# Patient Record
Sex: FEMALE | Race: WHITE
[De-identification: names, ages, dates, MRNs, and addresses within clinical notes are randomized per-mention and may not be internally consistent; named-entity substitution may affect disease eponyms.]

---

## 2017-05-26 ENCOUNTER — HOSPITAL ENCOUNTER (EMERGENCY)
Dept: HOSPITAL 4 - SED | Age: 72
Discharge: HOME | End: 2017-05-26
Payer: COMMERCIAL

## 2017-05-26 VITALS — HEIGHT: 57 IN | WEIGHT: 138 LBS | BODY MASS INDEX: 29.77 KG/M2

## 2017-05-26 VITALS — SYSTOLIC BLOOD PRESSURE: 154 MMHG

## 2017-05-26 VITALS — SYSTOLIC BLOOD PRESSURE: 127 MMHG

## 2017-05-26 DIAGNOSIS — J20.9: Primary | ICD-10-CM

## 2017-05-26 DIAGNOSIS — Z85.43: ICD-10-CM

## 2017-05-26 DIAGNOSIS — I10: ICD-10-CM

## 2017-05-26 DIAGNOSIS — Z88.0: ICD-10-CM

## 2017-05-26 LAB
ALBUMIN SERPL BCP-MCNC: 4.2 G/DL (ref 3.4–4.8)
ALT SERPL W P-5'-P-CCNC: 28 U/L (ref 12–78)
ANION GAP SERPL CALCULATED.3IONS-SCNC: 9 MMOL/L (ref 5–15)
AST SERPL W P-5'-P-CCNC: 30 U/L (ref 10–37)
BASOPHILS # BLD AUTO: 0 K/UL (ref 0–0.2)
BASOPHILS NFR BLD AUTO: 0.5 % (ref 0–2)
BILIRUB SERPL-MCNC: 0.5 MG/DL (ref 0–1)
BUN SERPL-MCNC: 22 MG/DL (ref 8–21)
CALCIUM SERPL-MCNC: 9.3 MG/DL (ref 8.4–11)
CHLORIDE SERPL-SCNC: 101 MMOL/L (ref 98–107)
CK MB SERPL-CCNC: 2.2 NG/ML (ref 0–3.6)
CK MB SERPL-RTO: 0.5 % (ref 0–2.9)
CK SERPL-CCNC: 452 U/L (ref 26–192)
CREAT SERPL-MCNC: 0.73 MG/DL (ref 0.55–1.3)
EOSINOPHIL # BLD AUTO: 0.1 K/UL (ref 0–0.4)
EOSINOPHIL NFR BLD AUTO: 1 % (ref 0–4)
ERYTHROCYTE [DISTWIDTH] IN BLOOD BY AUTOMATED COUNT: 14.7 % (ref 9–15)
GFR SERPL CREATININE-BSD FRML MDRD: (no result) ML/MIN (ref 90–?)
GLUCOSE SERPL-MCNC: 89 MG/DL (ref 70–99)
HCT VFR BLD AUTO: 36.2 % (ref 36–48)
HGB BLD-MCNC: 12 G/DL (ref 12–16)
INR PPP: 1 (ref 0.8–1.2)
LYMPHOCYTES # BLD AUTO: 1.5 K/UL (ref 1–5.5)
LYMPHOCYTES NFR BLD AUTO: 28.4 % (ref 20.5–51.5)
MCH RBC QN AUTO: 30 PG (ref 27–31)
MCHC RBC AUTO-ENTMCNC: 33 % (ref 32–36)
MCV RBC AUTO: 90 FL (ref 79–98)
MONOCYTES # BLD MANUAL: 0.4 K/UL (ref 0–1)
MONOCYTES # BLD MANUAL: 8.2 % (ref 1.7–9.3)
NEUTROPHILS # BLD AUTO: 3.1 K/UL (ref 1.8–7.7)
NEUTROPHILS NFR BLD AUTO: 61.9 % (ref 40–70)
PLATELET # BLD AUTO: 183 K/UL (ref 130–430)
POTASSIUM SERPL-SCNC: 3.9 MMOL/L (ref 3.5–5.1)
PROT SERPL-MCNC: 7.8 G/DL (ref 6.4–8.3)
PROTHROMBIN TIME: 10.5 SECS (ref 9.5–12.5)
RBC # BLD AUTO: 4 MIL/UL (ref 4.2–6.2)
SODIUM SERPLBLD-SCNC: 137 MMOL/L (ref 136–145)
WBC # BLD AUTO: 5.1 K/UL (ref 4.8–10.8)

## 2017-05-26 NOTE — NUR
No ER beds available at this time. Pt placed to ER waiting room in stable 
condition, no SOB noted, no neurodeficits, denies c/o C/P.

-------------------------------------------------------------------------------

Addendum: 05/26/17 at 1750 by JOSE

-------------------------------------------------------------------------------

Skin pink, warm, dry, cap refil < 2 sec.

## 2017-05-26 NOTE — NUR
Patient given written and verbal discharge instructions and verbalizes 
understanding.  ER MD Moscoso discussed with patient the results and treatment 
provided. Patient in stable condition. ID arm band removed. IV catheter removed 
intact and dressing applied, no active bleeding.

Rx of robitussin, zithromax, albuterol given. Patient educated on pain 
management and to follow up with PMD. Pain Scale 0/10.

Opportunity for questions provided and answered.

## 2017-05-26 NOTE — NUR
Stable, no shortness of breath or distress noted. Pt states had a "short of 
breath episode" prior to arrival at rest,states had prior similar "episode" 1 
month ago. Denies any chest pain. Patient able to communicate in full sentences 
w/o difficulty. No other complaints/injuries per pt or noted.

## 2017-07-08 ENCOUNTER — HOSPITAL ENCOUNTER (INPATIENT)
Dept: HOSPITAL 4 - SED | Age: 72
LOS: 6 days | Discharge: HOME | DRG: 415 | End: 2017-07-14
Attending: INTERNAL MEDICINE | Admitting: INTERNAL MEDICINE
Payer: COMMERCIAL

## 2017-07-08 VITALS — SYSTOLIC BLOOD PRESSURE: 133 MMHG

## 2017-07-08 VITALS — SYSTOLIC BLOOD PRESSURE: 131 MMHG

## 2017-07-08 VITALS — SYSTOLIC BLOOD PRESSURE: 124 MMHG

## 2017-07-08 VITALS — SYSTOLIC BLOOD PRESSURE: 157 MMHG

## 2017-07-08 VITALS — SYSTOLIC BLOOD PRESSURE: 112 MMHG

## 2017-07-08 VITALS — HEIGHT: 57 IN | WEIGHT: 141 LBS | BODY MASS INDEX: 30.42 KG/M2

## 2017-07-08 VITALS — SYSTOLIC BLOOD PRESSURE: 120 MMHG

## 2017-07-08 DIAGNOSIS — K43.0: ICD-10-CM

## 2017-07-08 DIAGNOSIS — E03.9: ICD-10-CM

## 2017-07-08 DIAGNOSIS — E87.6: ICD-10-CM

## 2017-07-08 DIAGNOSIS — Z92.21: ICD-10-CM

## 2017-07-08 DIAGNOSIS — M25.552: ICD-10-CM

## 2017-07-08 DIAGNOSIS — Z85.43: ICD-10-CM

## 2017-07-08 DIAGNOSIS — I10: ICD-10-CM

## 2017-07-08 DIAGNOSIS — K80.10: Primary | ICD-10-CM

## 2017-07-08 DIAGNOSIS — G47.33: ICD-10-CM

## 2017-07-08 DIAGNOSIS — Z88.0: ICD-10-CM

## 2017-07-08 DIAGNOSIS — R16.1: ICD-10-CM

## 2017-07-08 DIAGNOSIS — G89.29: ICD-10-CM

## 2017-07-08 DIAGNOSIS — Z92.3: ICD-10-CM

## 2017-07-08 DIAGNOSIS — D63.8: ICD-10-CM

## 2017-07-08 DIAGNOSIS — M25.551: ICD-10-CM

## 2017-07-08 LAB
ALBUMIN SERPL BCP-MCNC: 3.7 G/DL (ref 3.4–4.8)
ALT SERPL W P-5'-P-CCNC: 23 U/L (ref 12–78)
AMYLASE SERPL-CCNC: 32 U/L (ref 0–100)
ANION GAP SERPL CALCULATED.3IONS-SCNC: 9 MMOL/L (ref 5–15)
AST SERPL W P-5'-P-CCNC: 28 U/L (ref 10–37)
BASOPHILS # BLD AUTO: 0 K/UL (ref 0–0.2)
BASOPHILS NFR BLD AUTO: 0.4 % (ref 0–2)
BILIRUB SERPL-MCNC: 0.6 MG/DL (ref 0–1)
BUN SERPL-MCNC: 11 MG/DL (ref 8–21)
CALCIUM SERPL-MCNC: 8.6 MG/DL (ref 8.4–11)
CHLORIDE SERPL-SCNC: 103 MMOL/L (ref 98–107)
CREAT SERPL-MCNC: 0.67 MG/DL (ref 0.55–1.3)
EOSINOPHIL # BLD AUTO: 0.1 K/UL (ref 0–0.4)
EOSINOPHIL NFR BLD AUTO: 1.1 % (ref 0–4)
ERYTHROCYTE [DISTWIDTH] IN BLOOD BY AUTOMATED COUNT: 15.4 % (ref 9–15)
GFR SERPL CREATININE-BSD FRML MDRD: (no result) ML/MIN (ref 90–?)
GLUCOSE SERPL-MCNC: 99 MG/DL (ref 70–99)
HCT VFR BLD AUTO: 38.5 % (ref 36–48)
HGB BLD-MCNC: 12.7 G/DL (ref 12–16)
INR PPP: 0.9 (ref 0.8–1.2)
LIPASE SERPL-CCNC: 87 U/L (ref 73–393)
LYMPHOCYTES # BLD AUTO: 1.2 K/UL (ref 1–5.5)
LYMPHOCYTES NFR BLD AUTO: 21.8 % (ref 20.5–51.5)
MCH RBC QN AUTO: 30 PG (ref 27–31)
MCHC RBC AUTO-ENTMCNC: 33 % (ref 32–36)
MCV RBC AUTO: 90 FL (ref 79–98)
MONOCYTES # BLD MANUAL: 0.3 K/UL (ref 0–1)
MONOCYTES # BLD MANUAL: 6.3 % (ref 1.7–9.3)
NEUTROPHILS # BLD AUTO: 3.9 K/UL (ref 1.8–7.7)
NEUTROPHILS NFR BLD AUTO: 70.4 % (ref 40–70)
PLATELET # BLD AUTO: 183 K/UL (ref 130–430)
POTASSIUM SERPL-SCNC: 4.1 MMOL/L (ref 3.5–5.1)
PROT SERPL-MCNC: 7.4 G/DL (ref 6.4–8.3)
PROTHROMBIN TIME: 10.2 SECS (ref 9.5–12.5)
RBC # BLD AUTO: 4.28 MIL/UL (ref 4.2–6.2)
SODIUM SERPLBLD-SCNC: 139 MMOL/L (ref 136–145)
WBC # BLD AUTO: 5.5 K/UL (ref 4.8–10.8)

## 2017-07-08 PROCEDURE — C1727 CATH, BAL TIS DIS, NON-VAS: HCPCS

## 2017-07-08 PROCEDURE — A9537 TC99M MEBROFENIN: HCPCS

## 2017-07-08 PROCEDURE — C9290 INJ, BUPIVACAINE LIPOSOME: HCPCS

## 2017-07-08 RX ADMIN — SODIUM CHLORIDE, SODIUM LACTATE, POTASSIUM CHLORIDE, AND CALCIUM CHLORIDE SCH MLS/HR: 600; 310; 30; 20 INJECTION, SOLUTION INTRAVENOUS at 17:51

## 2017-07-08 RX ADMIN — DOCUSATE SODIUM SCH MG: 250 CAPSULE, LIQUID FILLED ORAL at 21:00

## 2017-07-09 VITALS — SYSTOLIC BLOOD PRESSURE: 120 MMHG

## 2017-07-09 VITALS — SYSTOLIC BLOOD PRESSURE: 147 MMHG

## 2017-07-09 VITALS — SYSTOLIC BLOOD PRESSURE: 143 MMHG

## 2017-07-09 VITALS — SYSTOLIC BLOOD PRESSURE: 107 MMHG

## 2017-07-09 VITALS — SYSTOLIC BLOOD PRESSURE: 162 MMHG

## 2017-07-09 LAB
ALBUMIN SERPL BCP-MCNC: 3.3 G/DL (ref 3.4–4.8)
ALT SERPL W P-5'-P-CCNC: 16 U/L (ref 12–78)
ANION GAP SERPL CALCULATED.3IONS-SCNC: 9 MMOL/L (ref 5–15)
APPEARANCE UR: CLEAR
AST SERPL W P-5'-P-CCNC: 26 U/L (ref 10–37)
BACTERIA URNS QL MICRO: (no result) /HPF
BASOPHILS # BLD AUTO: 0 K/UL (ref 0–0.2)
BASOPHILS NFR BLD AUTO: 0.4 % (ref 0–2)
BILIRUB SERPL-MCNC: 0.7 MG/DL (ref 0–1)
BILIRUB UR QL STRIP: NEGATIVE
BUN SERPL-MCNC: 12 MG/DL (ref 8–21)
CALCIUM SERPL-MCNC: 8.4 MG/DL (ref 8.4–11)
CHLORIDE SERPL-SCNC: 108 MMOL/L (ref 98–107)
COLOR UR: YELLOW
CREAT SERPL-MCNC: 0.6 MG/DL (ref 0.55–1.3)
EOSINOPHIL # BLD AUTO: 0 K/UL (ref 0–0.4)
EOSINOPHIL NFR BLD AUTO: 0.8 % (ref 0–4)
ERYTHROCYTE [DISTWIDTH] IN BLOOD BY AUTOMATED COUNT: 15.1 % (ref 9–15)
GFR SERPL CREATININE-BSD FRML MDRD: (no result) ML/MIN (ref 90–?)
GLUCOSE SERPL-MCNC: 85 MG/DL (ref 70–99)
GLUCOSE UR STRIP-MCNC: NEGATIVE MG/DL
HCT VFR BLD AUTO: 35 % (ref 36–48)
HGB BLD-MCNC: 11.8 G/DL (ref 12–16)
HGB UR QL STRIP: (no result)
KETONES UR STRIP-MCNC: (no result) MG/DL
LEUKOCYTE ESTERASE UR QL STRIP: NEGATIVE
LIPASE SERPL-CCNC: 82 U/L (ref 73–393)
LYMPHOCYTES # BLD AUTO: 1 K/UL (ref 1–5.5)
LYMPHOCYTES NFR BLD AUTO: 16.6 % (ref 20.5–51.5)
MCH RBC QN AUTO: 30 PG (ref 27–31)
MCHC RBC AUTO-ENTMCNC: 34 % (ref 32–36)
MCV RBC AUTO: 90 FL (ref 79–98)
MONOCYTES # BLD MANUAL: 0.3 K/UL (ref 0–1)
MONOCYTES # BLD MANUAL: 5.4 % (ref 1.7–9.3)
MUCOUS THREADS URNS QL MICRO: (no result) /LPF
NEUTROPHILS # BLD AUTO: 4.7 K/UL (ref 1.8–7.7)
NEUTROPHILS NFR BLD AUTO: 76.8 % (ref 40–70)
NITRITE UR QL STRIP: NEGATIVE
PH UR STRIP: 7 [PH] (ref 5–8)
PLATELET # BLD AUTO: 143 K/UL (ref 130–430)
POTASSIUM SERPL-SCNC: 3.5 MMOL/L (ref 3.5–5.1)
PROT SERPL-MCNC: 6.8 G/DL (ref 6.4–8.3)
PROT UR QL STRIP: NEGATIVE
RBC # BLD AUTO: 3.88 MIL/UL (ref 4.2–6.2)
RBC #/AREA URNS HPF: (no result) /HPF (ref 0–3)
SODIUM SERPLBLD-SCNC: 144 MMOL/L (ref 136–145)
SP GR UR STRIP: 1.01 (ref 1–1.03)
UROBILINOGEN UR STRIP-MCNC: 0.2 MG/DL (ref 0.2–1)
WBC # BLD AUTO: 6 K/UL (ref 4.8–10.8)
WBC #/AREA URNS HPF: (no result) /HPF (ref 0–3)

## 2017-07-09 RX ADMIN — DOCUSATE SODIUM SCH MG: 250 CAPSULE, LIQUID FILLED ORAL at 09:48

## 2017-07-09 RX ADMIN — ZOLPIDEM TARTRATE SCH MG: 5 TABLET, FILM COATED ORAL at 21:00

## 2017-07-09 RX ADMIN — DOCUSATE SODIUM SCH MG: 250 CAPSULE, LIQUID FILLED ORAL at 20:53

## 2017-07-09 RX ADMIN — SODIUM CHLORIDE, SODIUM LACTATE, POTASSIUM CHLORIDE, AND CALCIUM CHLORIDE SCH MLS/HR: 600; 310; 30; 20 INJECTION, SOLUTION INTRAVENOUS at 04:11

## 2017-07-10 VITALS — SYSTOLIC BLOOD PRESSURE: 102 MMHG

## 2017-07-10 VITALS — SYSTOLIC BLOOD PRESSURE: 148 MMHG

## 2017-07-10 VITALS — SYSTOLIC BLOOD PRESSURE: 158 MMHG

## 2017-07-10 VITALS — SYSTOLIC BLOOD PRESSURE: 122 MMHG

## 2017-07-10 VITALS — SYSTOLIC BLOOD PRESSURE: 141 MMHG

## 2017-07-10 PROCEDURE — 0FT40ZZ RESECTION OF GALLBLADDER, OPEN APPROACH: ICD-10-PCS | Performed by: SURGERY

## 2017-07-10 PROCEDURE — 0WUF0JZ SUPPLEMENT ABDOMINAL WALL WITH SYNTHETIC SUBSTITUTE, OPEN APPROACH: ICD-10-PCS | Performed by: EMERGENCY MEDICINE

## 2017-07-10 RX ADMIN — ZOLPIDEM TARTRATE SCH MG: 5 TABLET, FILM COATED ORAL at 22:34

## 2017-07-10 RX ADMIN — LEVOFLOXACIN SCH MLS/HR: 5 INJECTION, SOLUTION INTRAVENOUS at 23:42

## 2017-07-10 RX ADMIN — DOCUSATE SODIUM SCH MG: 250 CAPSULE, LIQUID FILLED ORAL at 08:54

## 2017-07-10 RX ADMIN — DOCUSATE SODIUM SCH MG: 250 CAPSULE, LIQUID FILLED ORAL at 22:32

## 2017-07-10 RX ADMIN — METRONIDAZOLE SCH MLS/HR: 500 SOLUTION INTRAVENOUS at 22:33

## 2017-07-10 RX ADMIN — DEXTROSE AND SODIUM CHLORIDE SCH MLS/HR: 5; 450 INJECTION, SOLUTION INTRAVENOUS at 22:33

## 2017-07-11 VITALS — SYSTOLIC BLOOD PRESSURE: 128 MMHG

## 2017-07-11 VITALS — SYSTOLIC BLOOD PRESSURE: 138 MMHG

## 2017-07-11 VITALS — SYSTOLIC BLOOD PRESSURE: 173 MMHG

## 2017-07-11 VITALS — SYSTOLIC BLOOD PRESSURE: 135 MMHG

## 2017-07-11 VITALS — SYSTOLIC BLOOD PRESSURE: 144 MMHG

## 2017-07-11 LAB
ALBUMIN SERPL BCP-MCNC: 3 G/DL (ref 3.4–4.8)
ALBUMIN SERPL BCP-MCNC: 3.2 G/DL (ref 3.4–4.8)
ALT SERPL W P-5'-P-CCNC: 29 U/L (ref 12–78)
ALT SERPL W P-5'-P-CCNC: 30 U/L (ref 12–78)
ANION GAP SERPL CALCULATED.3IONS-SCNC: 7 MMOL/L (ref 5–15)
ANION GAP SERPL CALCULATED.3IONS-SCNC: 7 MMOL/L (ref 5–15)
APPEARANCE UR: CLEAR
AST SERPL W P-5'-P-CCNC: 38 U/L (ref 10–37)
AST SERPL W P-5'-P-CCNC: 43 U/L (ref 10–37)
BACTERIA URNS QL MICRO: (no result) /HPF
BASOPHILS # BLD AUTO: 0 K/UL (ref 0–0.2)
BASOPHILS NFR BLD AUTO: 0.1 % (ref 0–2)
BILIRUB SERPL-MCNC: 0.5 MG/DL (ref 0–1)
BILIRUB SERPL-MCNC: 0.6 MG/DL (ref 0–1)
BILIRUB UR QL STRIP: NEGATIVE
BUN SERPL-MCNC: 9 MG/DL (ref 8–21)
BUN SERPL-MCNC: 9 MG/DL (ref 8–21)
CALCIUM SERPL-MCNC: 7.4 MG/DL (ref 8.4–11)
CALCIUM SERPL-MCNC: 7.7 MG/DL (ref 8.4–11)
CHLORIDE SERPL-SCNC: 103 MMOL/L (ref 98–107)
CHLORIDE SERPL-SCNC: 104 MMOL/L (ref 98–107)
COLOR UR: YELLOW
CREAT SERPL-MCNC: 0.71 MG/DL (ref 0.55–1.3)
CREAT SERPL-MCNC: 0.73 MG/DL (ref 0.55–1.3)
EOSINOPHIL # BLD AUTO: 0 K/UL (ref 0–0.4)
EOSINOPHIL NFR BLD AUTO: 0.1 % (ref 0–4)
ERYTHROCYTE [DISTWIDTH] IN BLOOD BY AUTOMATED COUNT: 15 % (ref 9–15)
GFR SERPL CREATININE-BSD FRML MDRD: (no result) ML/MIN (ref 90–?)
GFR SERPL CREATININE-BSD FRML MDRD: (no result) ML/MIN (ref 90–?)
GLUCOSE SERPL-MCNC: 136 MG/DL (ref 70–99)
GLUCOSE SERPL-MCNC: 152 MG/DL (ref 70–99)
GLUCOSE UR STRIP-MCNC: NEGATIVE MG/DL
HCT VFR BLD AUTO: 35.8 % (ref 36–48)
HGB BLD-MCNC: 12.2 G/DL (ref 12–16)
HGB UR QL STRIP: (no result)
IRON SERPL-MCNC: 24 MCG/DL (ref 37–145)
KETONES UR STRIP-MCNC: (no result) MG/DL
LEUKOCYTE ESTERASE UR QL STRIP: NEGATIVE
LYMPHOCYTES # BLD AUTO: 0.6 K/UL (ref 1–5.5)
LYMPHOCYTES NFR BLD AUTO: 6.9 % (ref 20.5–51.5)
MCH RBC QN AUTO: 31 PG (ref 27–31)
MCHC RBC AUTO-ENTMCNC: 34 % (ref 32–36)
MCV RBC AUTO: 91 FL (ref 79–98)
MONOCYTES # BLD MANUAL: 0.5 K/UL (ref 0–1)
MONOCYTES # BLD MANUAL: 6 % (ref 1.7–9.3)
NEUTROPHILS # BLD AUTO: 7.5 K/UL (ref 1.8–7.7)
NEUTROPHILS NFR BLD AUTO: 86.9 % (ref 40–70)
NITRITE UR QL STRIP: NEGATIVE
PH UR STRIP: 6 [PH] (ref 5–8)
PLATELET # BLD AUTO: 146 K/UL (ref 130–430)
POTASSIUM SERPL-SCNC: 3.6 MMOL/L (ref 3.5–5.1)
POTASSIUM SERPL-SCNC: 3.6 MMOL/L (ref 3.5–5.1)
PROT SERPL-MCNC: 6.4 G/DL (ref 6.4–8.3)
PROT SERPL-MCNC: 6.7 G/DL (ref 6.4–8.3)
PROT UR QL STRIP: NEGATIVE
RBC # BLD AUTO: 3.93 MIL/UL (ref 4.2–6.2)
RBC #/AREA URNS HPF: (no result) /HPF (ref 0–3)
SODIUM SERPLBLD-SCNC: 136 MMOL/L (ref 136–145)
SODIUM SERPLBLD-SCNC: 137 MMOL/L (ref 136–145)
SP GR UR STRIP: 1.02 (ref 1–1.03)
TIBC SERPL-MCNC: 221 UG/DL (ref 250–450)
TSH SERPL DL<=0.05 MIU/L-ACNC: 62.06 UIU/ML (ref 0.34–4.82)
UROBILINOGEN UR STRIP-MCNC: 0.2 MG/DL (ref 0.2–1)
WBC # BLD AUTO: 8.6 K/UL (ref 4.8–10.8)
WBC #/AREA URNS HPF: (no result) /HPF (ref 0–3)

## 2017-07-11 RX ADMIN — HYDROMORPHONE HYDROCHLORIDE PRN MG: 1 INJECTION, SOLUTION INTRAMUSCULAR; INTRAVENOUS; SUBCUTANEOUS at 00:07

## 2017-07-11 RX ADMIN — ZOLPIDEM TARTRATE SCH MG: 5 TABLET, FILM COATED ORAL at 21:00

## 2017-07-11 RX ADMIN — HYDROMORPHONE HYDROCHLORIDE PRN MG: 1 INJECTION, SOLUTION INTRAMUSCULAR; INTRAVENOUS; SUBCUTANEOUS at 05:21

## 2017-07-11 RX ADMIN — HYDROMORPHONE HYDROCHLORIDE PRN MG: 1 INJECTION, SOLUTION INTRAMUSCULAR; INTRAVENOUS; SUBCUTANEOUS at 21:07

## 2017-07-11 RX ADMIN — HYDROMORPHONE HYDROCHLORIDE PRN MG: 1 INJECTION, SOLUTION INTRAMUSCULAR; INTRAVENOUS; SUBCUTANEOUS at 17:03

## 2017-07-11 RX ADMIN — DEXTROSE AND SODIUM CHLORIDE SCH MLS/HR: 5; 450 INJECTION, SOLUTION INTRAVENOUS at 09:04

## 2017-07-11 RX ADMIN — LEVOFLOXACIN SCH MLS/HR: 5 INJECTION, SOLUTION INTRAVENOUS at 21:10

## 2017-07-11 RX ADMIN — METRONIDAZOLE SCH MLS/HR: 500 SOLUTION INTRAVENOUS at 05:18

## 2017-07-11 RX ADMIN — DEXTROSE AND SODIUM CHLORIDE SCH MLS/HR: 5; 450 INJECTION, SOLUTION INTRAVENOUS at 09:02

## 2017-07-11 RX ADMIN — DOCUSATE SODIUM SCH MG: 250 CAPSULE, LIQUID FILLED ORAL at 09:00

## 2017-07-11 RX ADMIN — DOCUSATE SODIUM SCH MG: 250 CAPSULE, LIQUID FILLED ORAL at 21:00

## 2017-07-12 VITALS — SYSTOLIC BLOOD PRESSURE: 119 MMHG

## 2017-07-12 VITALS — SYSTOLIC BLOOD PRESSURE: 124 MMHG

## 2017-07-12 VITALS — SYSTOLIC BLOOD PRESSURE: 134 MMHG

## 2017-07-12 VITALS — SYSTOLIC BLOOD PRESSURE: 117 MMHG

## 2017-07-12 VITALS — SYSTOLIC BLOOD PRESSURE: 110 MMHG

## 2017-07-12 VITALS — SYSTOLIC BLOOD PRESSURE: 122 MMHG

## 2017-07-12 LAB
ANION GAP SERPL CALCULATED.3IONS-SCNC: < 3 MMOL/L (ref 5–15)
BASOPHILS # BLD AUTO: 0 K/UL (ref 0–0.2)
BASOPHILS NFR BLD AUTO: 0.2 % (ref 0–2)
BUN SERPL-MCNC: 7 MG/DL (ref 8–21)
CALCIUM SERPL-MCNC: 7.3 MG/DL (ref 8.4–11)
CHLORIDE SERPL-SCNC: 105 MMOL/L (ref 98–107)
CREAT SERPL-MCNC: 0.92 MG/DL (ref 0.55–1.3)
EOSINOPHIL # BLD AUTO: 0.1 K/UL (ref 0–0.4)
EOSINOPHIL NFR BLD AUTO: 1.9 % (ref 0–4)
ERYTHROCYTE [DISTWIDTH] IN BLOOD BY AUTOMATED COUNT: 14.8 % (ref 9–15)
GFR SERPL CREATININE-BSD FRML MDRD: (no result) ML/MIN (ref 90–?)
GLUCOSE SERPL-MCNC: 119 MG/DL (ref 70–99)
HCT VFR BLD AUTO: 32.5 % (ref 36–48)
HGB BLD-MCNC: 11.2 G/DL (ref 12–16)
LYMPHOCYTES # BLD AUTO: 0.8 K/UL (ref 1–5.5)
LYMPHOCYTES NFR BLD AUTO: 11.3 % (ref 20.5–51.5)
MCH RBC QN AUTO: 31 PG (ref 27–31)
MCHC RBC AUTO-ENTMCNC: 35 % (ref 32–36)
MCV RBC AUTO: 90 FL (ref 79–98)
MONOCYTES # BLD MANUAL: 0.6 K/UL (ref 0–1)
MONOCYTES # BLD MANUAL: 8.9 % (ref 1.7–9.3)
NEUTROPHILS # BLD AUTO: 5.3 K/UL (ref 1.8–7.7)
NEUTROPHILS NFR BLD AUTO: 77.7 % (ref 40–70)
PLATELET # BLD AUTO: 141 K/UL (ref 130–430)
POTASSIUM SERPL-SCNC: 3.9 MMOL/L (ref 3.5–5.1)
RBC # BLD AUTO: 3.59 MIL/UL (ref 4.2–6.2)
SODIUM SERPLBLD-SCNC: 137 MMOL/L (ref 136–145)
T4 FREE SERPL-MCNC: 0.2 NG/DL (ref 0.6–1.6)
TSH SERPL DL<=0.05 MIU/L-ACNC: 61.05 UIU/ML (ref 0.34–4.82)
WBC # BLD AUTO: 6.8 K/UL (ref 4.8–10.8)

## 2017-07-12 RX ADMIN — ZOLPIDEM TARTRATE SCH MG: 5 TABLET, FILM COATED ORAL at 21:00

## 2017-07-12 RX ADMIN — HYDROMORPHONE HYDROCHLORIDE PRN MG: 1 INJECTION, SOLUTION INTRAMUSCULAR; INTRAVENOUS; SUBCUTANEOUS at 05:09

## 2017-07-12 RX ADMIN — DOCUSATE SODIUM SCH MG: 250 CAPSULE, LIQUID FILLED ORAL at 09:00

## 2017-07-12 RX ADMIN — DOCUSATE SODIUM SCH MG: 250 CAPSULE, LIQUID FILLED ORAL at 21:00

## 2017-07-12 RX ADMIN — HYDROMORPHONE HYDROCHLORIDE PRN MG: 1 INJECTION, SOLUTION INTRAMUSCULAR; INTRAVENOUS; SUBCUTANEOUS at 20:36

## 2017-07-12 RX ADMIN — HYDROMORPHONE HYDROCHLORIDE PRN MG: 1 INJECTION, SOLUTION INTRAMUSCULAR; INTRAVENOUS; SUBCUTANEOUS at 00:22

## 2017-07-12 RX ADMIN — SODIUM FERRIC GLUCONATE COMPLEX IN SUCROSE SCH MLS/HR: 12.5 INJECTION INTRAVENOUS at 18:37

## 2017-07-12 RX ADMIN — HYDROMORPHONE HYDROCHLORIDE PRN MG: 1 INJECTION, SOLUTION INTRAMUSCULAR; INTRAVENOUS; SUBCUTANEOUS at 12:28

## 2017-07-12 RX ADMIN — HYDROMORPHONE HYDROCHLORIDE PRN MG: 1 INJECTION, SOLUTION INTRAMUSCULAR; INTRAVENOUS; SUBCUTANEOUS at 15:35

## 2017-07-12 RX ADMIN — LEVOFLOXACIN SCH MLS/HR: 5 INJECTION, SOLUTION INTRAVENOUS at 20:35

## 2017-07-12 RX ADMIN — DEXTROSE AND SODIUM CHLORIDE SCH MLS/HR: 5; 450 INJECTION, SOLUTION INTRAVENOUS at 06:23

## 2017-07-12 RX ADMIN — HYDROMORPHONE HYDROCHLORIDE PRN MG: 1 INJECTION, SOLUTION INTRAMUSCULAR; INTRAVENOUS; SUBCUTANEOUS at 09:22

## 2017-07-12 RX ADMIN — METOCLOPRAMIDE SCH MG: 5 INJECTION, SOLUTION INTRAMUSCULAR; INTRAVENOUS at 18:37

## 2017-07-12 RX ADMIN — METOCLOPRAMIDE SCH MG: 5 INJECTION, SOLUTION INTRAMUSCULAR; INTRAVENOUS at 23:23

## 2017-07-12 RX ADMIN — DEXTROSE AND SODIUM CHLORIDE SCH MLS/HR: 5; 450 INJECTION, SOLUTION INTRAVENOUS at 15:36

## 2017-07-13 VITALS — SYSTOLIC BLOOD PRESSURE: 150 MMHG

## 2017-07-13 VITALS — SYSTOLIC BLOOD PRESSURE: 132 MMHG

## 2017-07-13 VITALS — SYSTOLIC BLOOD PRESSURE: 134 MMHG

## 2017-07-13 VITALS — SYSTOLIC BLOOD PRESSURE: 129 MMHG

## 2017-07-13 VITALS — SYSTOLIC BLOOD PRESSURE: 126 MMHG

## 2017-07-13 VITALS — SYSTOLIC BLOOD PRESSURE: 138 MMHG

## 2017-07-13 LAB
ANION GAP SERPL CALCULATED.3IONS-SCNC: 6 MMOL/L (ref 5–15)
BASOPHILS # BLD AUTO: 0 K/UL (ref 0–0.2)
BASOPHILS NFR BLD AUTO: 0.4 % (ref 0–2)
BUN SERPL-MCNC: 2 MG/DL (ref 8–21)
CALCIUM SERPL-MCNC: 7.7 MG/DL (ref 8.4–11)
CHLORIDE SERPL-SCNC: 103 MMOL/L (ref 98–107)
CREAT SERPL-MCNC: 0.66 MG/DL (ref 0.55–1.3)
EOSINOPHIL # BLD AUTO: 0.1 K/UL (ref 0–0.4)
EOSINOPHIL NFR BLD AUTO: 2.1 % (ref 0–4)
ERYTHROCYTE [DISTWIDTH] IN BLOOD BY AUTOMATED COUNT: 15 % (ref 9–15)
FOLATE (FOLIC ACID): >20 NG/ML (ref 3–?)
GFR SERPL CREATININE-BSD FRML MDRD: (no result) ML/MIN (ref 90–?)
GLUCOSE SERPL-MCNC: 134 MG/DL (ref 70–99)
HCT VFR BLD AUTO: 32.7 % (ref 36–48)
HGB BLD-MCNC: 10.7 G/DL (ref 12–16)
LYMPHOCYTES # BLD AUTO: 0.6 K/UL (ref 1–5.5)
LYMPHOCYTES NFR BLD AUTO: 8.5 % (ref 20.5–51.5)
MCH RBC QN AUTO: 30 PG (ref 27–31)
MCHC RBC AUTO-ENTMCNC: 33 % (ref 32–36)
MCV RBC AUTO: 90 FL (ref 79–98)
MONOCYTES # BLD MANUAL: 0.4 K/UL (ref 0–1)
MONOCYTES # BLD MANUAL: 6.5 % (ref 1.7–9.3)
NEUTROPHILS # BLD AUTO: 5.5 K/UL (ref 1.8–7.7)
NEUTROPHILS NFR BLD AUTO: 82.5 % (ref 40–70)
PLATELET # BLD AUTO: 149 K/UL (ref 130–430)
POTASSIUM SERPL-SCNC: 3.4 MMOL/L (ref 3.5–5.1)
RBC # BLD AUTO: 3.62 MIL/UL (ref 4.2–6.2)
SODIUM SERPLBLD-SCNC: 136 MMOL/L (ref 136–145)
VIT B12 SERPL-MCNC: 722 PG/ML (ref 211–946)
WBC # BLD AUTO: 6.6 K/UL (ref 4.8–10.8)

## 2017-07-13 RX ADMIN — METOCLOPRAMIDE SCH MG: 5 INJECTION, SOLUTION INTRAMUSCULAR; INTRAVENOUS at 17:35

## 2017-07-13 RX ADMIN — DEXTROSE AND SODIUM CHLORIDE SCH MLS/HR: 5; 450 INJECTION, SOLUTION INTRAVENOUS at 14:10

## 2017-07-13 RX ADMIN — METOCLOPRAMIDE SCH MG: 5 INJECTION, SOLUTION INTRAMUSCULAR; INTRAVENOUS at 06:03

## 2017-07-13 RX ADMIN — LEVOFLOXACIN SCH MLS/HR: 5 INJECTION, SOLUTION INTRAVENOUS at 22:08

## 2017-07-13 RX ADMIN — DOCUSATE SODIUM SCH MG: 250 CAPSULE, LIQUID FILLED ORAL at 09:31

## 2017-07-13 RX ADMIN — DEXTROSE AND SODIUM CHLORIDE SCH MLS/HR: 5; 450 INJECTION, SOLUTION INTRAVENOUS at 22:09

## 2017-07-13 RX ADMIN — SODIUM FERRIC GLUCONATE COMPLEX IN SUCROSE SCH MLS/HR: 12.5 INJECTION INTRAVENOUS at 17:35

## 2017-07-13 RX ADMIN — DOCUSATE SODIUM SCH MG: 250 CAPSULE, LIQUID FILLED ORAL at 22:18

## 2017-07-13 RX ADMIN — DEXTROSE AND SODIUM CHLORIDE SCH MLS/HR: 5; 450 INJECTION, SOLUTION INTRAVENOUS at 22:19

## 2017-07-13 RX ADMIN — METOCLOPRAMIDE SCH MG: 5 INJECTION, SOLUTION INTRAMUSCULAR; INTRAVENOUS at 11:57

## 2017-07-13 RX ADMIN — ZOLPIDEM TARTRATE SCH MG: 5 TABLET, FILM COATED ORAL at 22:18

## 2017-07-13 RX ADMIN — DEXTROSE AND SODIUM CHLORIDE SCH MLS/HR: 5; 450 INJECTION, SOLUTION INTRAVENOUS at 01:33

## 2017-07-13 RX ADMIN — HYDROCODONE BITARTRATE AND ACETAMINOPHEN PRN TAB: 5; 325 TABLET ORAL at 09:32

## 2017-07-14 VITALS — SYSTOLIC BLOOD PRESSURE: 131 MMHG

## 2017-07-14 VITALS — SYSTOLIC BLOOD PRESSURE: 158 MMHG

## 2017-07-14 VITALS — SYSTOLIC BLOOD PRESSURE: 145 MMHG

## 2017-07-14 VITALS — SYSTOLIC BLOOD PRESSURE: 126 MMHG

## 2017-07-14 VITALS — SYSTOLIC BLOOD PRESSURE: 135 MMHG

## 2017-07-14 LAB
ANION GAP SERPL CALCULATED.3IONS-SCNC: 6 MMOL/L (ref 5–15)
BASOPHILS # BLD AUTO: 0 K/UL (ref 0–0.2)
BASOPHILS NFR BLD AUTO: 0.2 % (ref 0–2)
BUN SERPL-MCNC: 3 MG/DL (ref 8–21)
CALCIUM SERPL-MCNC: 7.8 MG/DL (ref 8.4–11)
CHLORIDE SERPL-SCNC: 104 MMOL/L (ref 98–107)
CREAT SERPL-MCNC: 0.55 MG/DL (ref 0.55–1.3)
EOSINOPHIL # BLD AUTO: 0.1 K/UL (ref 0–0.4)
EOSINOPHIL NFR BLD AUTO: 1.8 % (ref 0–4)
ERYTHROCYTE [DISTWIDTH] IN BLOOD BY AUTOMATED COUNT: 14.8 % (ref 9–15)
GFR SERPL CREATININE-BSD FRML MDRD: (no result) ML/MIN (ref 90–?)
GLUCOSE SERPL-MCNC: 118 MG/DL (ref 70–99)
HCT VFR BLD AUTO: 32.4 % (ref 36–48)
HGB BLD-MCNC: 10.9 G/DL (ref 12–16)
LYMPHOCYTES # BLD AUTO: 0.7 K/UL (ref 1–5.5)
LYMPHOCYTES NFR BLD AUTO: 12 % (ref 20.5–51.5)
MCH RBC QN AUTO: 30 PG (ref 27–31)
MCHC RBC AUTO-ENTMCNC: 34 % (ref 32–36)
MCV RBC AUTO: 91 FL (ref 79–98)
MONOCYTES # BLD MANUAL: 0.3 K/UL (ref 0–1)
MONOCYTES # BLD MANUAL: 6.3 % (ref 1.7–9.3)
NEUTROPHILS # BLD AUTO: 4.4 K/UL (ref 1.8–7.7)
NEUTROPHILS NFR BLD AUTO: 79.7 % (ref 40–70)
PLATELET # BLD AUTO: 143 K/UL (ref 130–430)
POTASSIUM SERPL-SCNC: 3.3 MMOL/L (ref 3.5–5.1)
RBC # BLD AUTO: 3.58 MIL/UL (ref 4.2–6.2)
SODIUM SERPLBLD-SCNC: 137 MMOL/L (ref 136–145)
WBC # BLD AUTO: 5.5 K/UL (ref 4.8–10.8)

## 2017-07-14 RX ADMIN — HYDROCODONE BITARTRATE AND ACETAMINOPHEN PRN TAB: 5; 325 TABLET ORAL at 09:02

## 2017-07-14 RX ADMIN — METOCLOPRAMIDE SCH MG: 5 INJECTION, SOLUTION INTRAMUSCULAR; INTRAVENOUS at 17:44

## 2017-07-14 RX ADMIN — DOCUSATE SODIUM SCH MG: 250 CAPSULE, LIQUID FILLED ORAL at 08:58

## 2017-07-14 RX ADMIN — METOCLOPRAMIDE SCH MG: 5 INJECTION, SOLUTION INTRAMUSCULAR; INTRAVENOUS at 00:00

## 2017-07-14 RX ADMIN — METOCLOPRAMIDE SCH MG: 5 INJECTION, SOLUTION INTRAMUSCULAR; INTRAVENOUS at 12:11

## 2017-07-14 RX ADMIN — METOCLOPRAMIDE SCH MG: 5 INJECTION, SOLUTION INTRAMUSCULAR; INTRAVENOUS at 05:35

## 2018-08-13 ENCOUNTER — HOSPITAL ENCOUNTER (INPATIENT)
Dept: HOSPITAL 4 - SED | Age: 73
LOS: 2 days | Discharge: HOME | DRG: 389 | End: 2018-08-15
Attending: INTERNAL MEDICINE | Admitting: INTERNAL MEDICINE
Payer: COMMERCIAL

## 2018-08-13 VITALS — WEIGHT: 109 LBS | HEIGHT: 59 IN | BODY MASS INDEX: 21.97 KG/M2

## 2018-08-13 VITALS — SYSTOLIC BLOOD PRESSURE: 132 MMHG

## 2018-08-13 VITALS — SYSTOLIC BLOOD PRESSURE: 156 MMHG

## 2018-08-13 VITALS — SYSTOLIC BLOOD PRESSURE: 149 MMHG

## 2018-08-13 DIAGNOSIS — I10: ICD-10-CM

## 2018-08-13 DIAGNOSIS — Z90.49: ICD-10-CM

## 2018-08-13 DIAGNOSIS — K56.51: Primary | ICD-10-CM

## 2018-08-13 DIAGNOSIS — C56.9: ICD-10-CM

## 2018-08-13 DIAGNOSIS — Z92.21: ICD-10-CM

## 2018-08-13 DIAGNOSIS — Z90.710: ICD-10-CM

## 2018-08-13 DIAGNOSIS — Z88.0: ICD-10-CM

## 2018-08-13 DIAGNOSIS — E03.9: ICD-10-CM

## 2018-08-13 LAB
ALBUMIN SERPL BCP-MCNC: 3.2 G/DL (ref 3.4–4.8)
ALT SERPL W P-5'-P-CCNC: 18 U/L (ref 12–78)
ANION GAP SERPL CALCULATED.3IONS-SCNC: 10 MMOL/L (ref 5–15)
AST SERPL W P-5'-P-CCNC: 24 U/L (ref 10–37)
BASOPHILS # BLD AUTO: 0 K/UL (ref 0–0.2)
BASOPHILS NFR BLD AUTO: 0.5 % (ref 0–2)
BILIRUB SERPL-MCNC: 0.3 MG/DL (ref 0–1)
BUN SERPL-MCNC: 21 MG/DL (ref 8–21)
CALCIUM SERPL-MCNC: 9.2 MG/DL (ref 8.4–11)
CHLORIDE SERPL-SCNC: 100 MMOL/L (ref 98–107)
CREAT SERPL-MCNC: 0.61 MG/DL (ref 0.55–1.3)
EOSINOPHIL # BLD AUTO: 0 K/UL (ref 0–0.4)
EOSINOPHIL NFR BLD AUTO: 0.2 % (ref 0–4)
ERYTHROCYTE [DISTWIDTH] IN BLOOD BY AUTOMATED COUNT: 16.6 % (ref 9–15)
GFR SERPL CREATININE-BSD FRML MDRD: (no result) ML/MIN (ref 90–?)
GLUCOSE SERPL-MCNC: 83 MG/DL (ref 70–99)
HCT VFR BLD AUTO: 34.3 % (ref 36–48)
HGB BLD-MCNC: 11.3 G/DL (ref 12–16)
INR PPP: 1 (ref 0.8–1.2)
LIPASE SERPL-CCNC: 104 U/L (ref 73–393)
LYMPHOCYTES # BLD AUTO: 0.5 K/UL (ref 1–5.5)
LYMPHOCYTES NFR BLD AUTO: 9.7 % (ref 20.5–51.5)
MCH RBC QN AUTO: 28 PG (ref 27–31)
MCHC RBC AUTO-ENTMCNC: 33 % (ref 32–36)
MCV RBC AUTO: 85 FL (ref 79–98)
MONOCYTES # BLD MANUAL: 0.5 K/UL (ref 0–1)
MONOCYTES # BLD MANUAL: 8.8 % (ref 1.7–9.3)
NEUTROPHILS # BLD AUTO: 4.2 K/UL (ref 1.8–7.7)
NEUTROPHILS NFR BLD AUTO: 80.8 % (ref 40–70)
PLATELET # BLD AUTO: 263 K/UL (ref 130–430)
POTASSIUM SERPL-SCNC: 4.2 MMOL/L (ref 3.5–5.1)
PROTHROMBIN TIME: 9.9 SECS (ref 9.5–12.5)
RBC # BLD AUTO: 4.04 MIL/UL (ref 4.2–6.2)
SODIUM SERPLBLD-SCNC: 137 MMOL/L (ref 136–145)
WBC # BLD AUTO: 5.2 K/UL (ref 4.8–10.8)

## 2018-08-13 RX ADMIN — DEXTROSE AND SODIUM CHLORIDE SCH MLS/HR: 5; 900 INJECTION, SOLUTION INTRAVENOUS at 18:51

## 2018-08-13 RX ADMIN — MORPHINE SULFATE PRN MG: 2 INJECTION, SOLUTION INTRAMUSCULAR; INTRAVENOUS at 18:52

## 2018-08-13 NOTE — NUR
CONSULTATION PAGED/CALLED

Reason for Consultation: SOB

Person Who was Notified: SPOKE WITH JESUS FROM  OFFICE 

Consulting Physician: DR. LEMUS  

Consultant Specialty: SURGION 

Ordering Physician: DR. ELIZABETH

## 2018-08-13 NOTE — NUR
OPENING NOTE



RECEIVED PT AND CARE FROM DAY SHIFT NURSE. PT IS SITTING UP AWAKE IN BED. PT ABLE TO MAKE 
NEEDS KNOWN. FAMILY IS AT BEDSIDE. PT DENIES PAIN AT THIS TIME. PT IS ON ROOM AIR. IV IS 
INTACT AND PATENT RUNNING IVF PER ORDERS. FALL AND SAFETY PRECAUTIONS ARE IN PLACE. BED 
LOCKED IN LOWEST POSITION. PT REFUSED BED ALARM DESPITE EDUCATION. ENCOURAGED PT TO CALL 
UPON AMBULATION. CALL LIGHT WITH PT. WILL CONTINUE TO MONITOR.

## 2018-08-13 NOTE — NUR
PT RETURNED FROM SMALL BOWEL FOLLOW THROUGH, 

TECH TO  PATIENT IN 2 HRS. IVF RECONNECTED, ANTIBIOTIC STILL INFUSING, CALL LIGHT 
PLACED WITHIN REACH

## 2018-08-13 NOTE — NUR
Patient will be admitted to care of DR MORELOS.  Admitted to MED SURG

 unit.  Will go to room 107-A.  Belongings list completed.  Summary report 
printed. Report will be given at bedside.

## 2018-08-13 NOTE — NUR
INITIAL NOTE

PT LAYING IN BED, AAOX4, NO S/S OF ACUTE DISTRESS, NO COMPLAINT OF NAUSEA OR PAIN AT THIS 
TIME, VSS. IV TO LEFT HAND INFILTRATED, NEW IV START TO RFA 22G. BREATHING EVEN AND 
UNLABORED. PT AWARE SHE IS NPO AT THIS TIME. PLAN OF CARE DISCUSSED, PT ORIENTED TO ROOM AND 
USE OF CALL LIGHT, PT VERBALIZED UNDERSTANDING. SAFETY PRECAUTIONS IN PLACE, CALL LIGHT 
WITHIN REACH, BED ALARM ON. WILL MONITOR PT CLOSELY

## 2018-08-13 NOTE — NUR
ROUNDING NOTE



ASSISTED PT TO BATHROOM. PT IS STEADY AND DENIES ANY DIZZINESS. CALL LIGHT WITH PT. WILL 
CONTINUE TO MONITOR.

## 2018-08-13 NOTE — NUR
DR MARTINES CALLED, 

UPDATED ON PATIENT HISTORY AND STATUS, MD ORDERED SMALL BOWEL FOLLOW THROUGH, PT EDUCATED 
REGARDING PROCEDURE

## 2018-08-13 NOTE — NUR
PT STATES CONSTIPATION WITH ANOREXIA AND ABD PAIN FOR LAST 2 DAYS. FAMILY AT 
BEDSIDE FOR EVALUATION

## 2018-08-13 NOTE — NUR
CONSULTATION PAGED



REASON FOR CONSULTATION:SBO

WAS CONSULT CALLED?Y

PERSON WHO WAS NOTIFIED:BEL

CONSULTING PHYSICIAN:AMANDEEP MÉNDEZ

CONSULTANT SPECIALTY:SURGEON

CONSULTANT PHONE NUMBER:[phone]

ORDERING PHYSICIAN:GABBY MCDONNELL

## 2018-08-13 NOTE — NUR
ADMISSION NOTE

Received patient from ER via frank, received report from HODAN WALSH. Patient admitted with 
diagnosis of SMALL BOWEL OBSTRUCTION. Patient oriented to hospital routine, call light, 
toileting and safety-patient verbalized understanding.

## 2018-08-13 NOTE — NUR
PAIN MANAGEMENT/ CLOSING NOTE 

PT SITTING IN BED, COMPLAINS OF ABD PAIN 5/10, MEDICATED PER PAIN SCALE. IVF INFUSING TO RFA 
AT ORDERED RATE, NO S/S OF INFILTRATION NOTED. ALL NEEDS ATTENDED TO THROUGHOUT SHIFT, 
SAFETY PRECAUTIONS MAINTAINED, CALL LIGHT WITHIN REACH, WILL GIVE REPORT TO FOLLOWING SHIFT

## 2018-08-13 NOTE — NUR
BOWEL X RAY



AUREA FROM RADIOLOGY COMPLETED SMALL BOWEL X RAY AT BEDSIDE. PT TOLERATED WELL. WILL 
CONTINUE TO MONITOR.

## 2018-08-14 VITALS — SYSTOLIC BLOOD PRESSURE: 126 MMHG

## 2018-08-14 VITALS — SYSTOLIC BLOOD PRESSURE: 130 MMHG

## 2018-08-14 VITALS — SYSTOLIC BLOOD PRESSURE: 121 MMHG

## 2018-08-14 VITALS — SYSTOLIC BLOOD PRESSURE: 120 MMHG

## 2018-08-14 RX ADMIN — DEXTROSE AND SODIUM CHLORIDE SCH MLS/HR: 5; 900 INJECTION, SOLUTION INTRAVENOUS at 08:06

## 2018-08-14 RX ADMIN — MORPHINE SULFATE PRN MG: 2 INJECTION, SOLUTION INTRAMUSCULAR; INTRAVENOUS at 16:29

## 2018-08-14 RX ADMIN — DEXTROSE AND SODIUM CHLORIDE SCH MLS/HR: 5; 900 INJECTION, SOLUTION INTRAVENOUS at 20:57

## 2018-08-14 NOTE — NUR
ROUNDING NOTE



PT IS ASLEEP. NO S/S OF DISTRESS OR DISCOMFORT. CALL LIGHT WITH PT. WILL CONTINUE TO 
MONITOR.

## 2018-08-14 NOTE — NUR
ROUNDING NOTE



PT IS SLEEPING IN BED AT THIS TIME. NO S/S OF DISTRESS. BREATHING IS EVEN AND UNLABORED. 
CALL LIGHT WITH PT. WILL CONTINUE TO MONITOR.

## 2018-08-14 NOTE — NUR
CLOSING NOTE



WILL ENDORSE CARE AND REPORT TO DAY SHIFT NURSE. PT CURRENTLY RESTING IN BED. PT DENIES 
DISTRESS OR DISCOMFORT. PT IN STABLE CONDITION. ALL NEEDS MET THROUGHOUT SHIFT. NO 
SIGNIFICANT CHANGES TO NOTE. . CALL LIGHT WITH PT. WILL CONTINUE TO MONITOR.

## 2018-08-14 NOTE — NUR
Conversation with Dr. Jackson:

Informed Dr. Jackson of the result of XR of small bowel through with new order of clear 
liq diet.

## 2018-08-14 NOTE — NUR
ROUNDING NOTE



PT AWAKE IN BED. DISCUSSED PAIN MEDICATION SIDE EFFECTS. WILL CONTINUE TO MONITOR. CALL  
LIGHT WITH PT.

## 2018-08-14 NOTE — NUR
SLEEPING MEDICATION/ DIET



ADMINISTERED SLEEPING MEDICATION PER ORDERS. INFORMED PT OF ADVANCED DIET. PT VERBALIZED 
UNDERSTANDING.

## 2018-08-14 NOTE — NUR
DR. LEMUS, ORDERS



SPOKE TO DR. LEMUS, INFORMED HIM OF PT CONDITION. ADVANCED DIET PER ORDERS. WILL FOLLOW 
THROUGH WITH ORDERS.

## 2018-08-14 NOTE — NUR
rounds:

patient resting on bed with daughter at bedside. Denies N/V after the clear liq lunch meal.

## 2018-08-14 NOTE — NUR
Closing notes:

Patient on bed reading a magazine. Stable. Needs attended. Safety measures in placed. Call 
light within reach. Report given at bedside to JILLIAN Sheppard.

## 2018-08-14 NOTE — NUR
OPENING NOTE



RECEIVED PT AND REPORT FROM DAY SHIFT NURSE. PT IS SITTING UP WATCHING TV. PT ABLE TO 
VERBALIZE NEEDS. PT DENIES PAIN AT THIS TIME. IV INTACT AND PATENT RUNNING IVF PER ORDERS. 
PT ON ROOM AIR. FALL AND SAFETY PRECAUTIONS IN PLACE. BED LOCKED IN LOWEST POSITION. BED 
ALARM ON. CALL LIGHT WITH PT. WILL CONTINUE TO MONITOR.

## 2018-08-15 VITALS — SYSTOLIC BLOOD PRESSURE: 129 MMHG

## 2018-08-15 VITALS — SYSTOLIC BLOOD PRESSURE: 150 MMHG

## 2018-08-15 VITALS — SYSTOLIC BLOOD PRESSURE: 144 MMHG

## 2018-08-15 VITALS — SYSTOLIC BLOOD PRESSURE: 128 MMHG

## 2018-08-15 RX ADMIN — DEXTROSE AND SODIUM CHLORIDE SCH MLS/HR: 5; 900 INJECTION, SOLUTION INTRAVENOUS at 06:48

## 2018-08-15 NOTE — NUR
CLOSING NOTE



WILL ENDORSE CARE AND REPORT TO DAY SHIFT NURSE. PT IS SLEEPING IN BED.ALL NEEDS MET 
THROUGHOUT SHIFT. PT IN STABLE CONDITION. NO SIGNIFICANT CHANGES TO NOTE. CALL LIGHT WITH 
PT. WILL CONTINUE TO MONITOR.

## 2018-08-15 NOTE — NUR
ROUNDING NOTE



PT APPEARS TO BE SLEEPING COMFORTABLY. NO S/S OF DISTRESS. NO NEEDS AT THIS TIME. CALL LIGHT 
WITH PT.

## 2018-08-15 NOTE — NUR
ROUNDING NOTE



PT ASLEEP IN BED. NO S/S OF DISTRESS. BREATHING EVEN AND UNLABORED. CALL LIGHT WITH PT.

## 2018-08-15 NOTE — NUR
OPENING NOTE

PATIENT AWAKE, DENIES PAIN, NO SIGNS OF DISTRESS. STATES SHE CAME TO THIS HOSPITAL FOR DR LEMUS'S EXCELLENT CARE. DENIES NEED FOR ANY PRN MEDICATIONS AT THIS TIME.

## 2018-08-17 NOTE — NUR
Discharge Follow Up Phone Calls:

 called and left a voice mail for pt ([phone]) on 8/16/18.  LCSW called 
and spoke with pt today.  Pt states that she is doing better overall; there are no questions 
regarding discharge or medication instructions; pt will schedule a PCP follow up 
appointment.  LCSW offered to assist pt with scheduling follow up appointments, but pt 
states that she will schedule the follow up appointment.  Pt did not express any other needs 
or concerns and denied the need for additional follow up at this time.  No further follow up 
phone calls required at this time.

## 2022-01-24 ENCOUNTER — PREPPED CHART (OUTPATIENT)
Dept: URBAN - METROPOLITAN AREA CLINIC 98 | Facility: CLINIC | Age: 77
End: 2022-01-24

## 2022-01-24 PROBLEM — H35.3231 NEOVASCULAR AMD WITH ACTIVE CNV: Status: STABILIZING | Noted: 2022-01-24

## 2022-01-24 PROBLEM — H40.052 OCULAR HYPERTENSION: Status: STABILIZING | Noted: 2022-01-24

## 2022-01-24 PROBLEM — H35.3231 NEOVASCULAR AMD WITH ACTIVE CNV: Noted: 2022-01-24

## 2022-01-24 PROBLEM — H40.052 OCULAR HYPERTENSION: Noted: 2022-01-24

## 2022-03-16 ASSESSMENT — VISUAL ACUITY
OS_CC: 20/60-1
OD_CC: 20/25-2

## 2022-03-16 ASSESSMENT — TONOMETRY
OS_IOP_MMHG: 22
OD_IOP_MMHG: 19

## 2022-03-21 ENCOUNTER — FOLLOW UP (OUTPATIENT)
Dept: URBAN - METROPOLITAN AREA CLINIC 98 | Facility: CLINIC | Age: 77
End: 2022-03-21

## 2022-03-21 DIAGNOSIS — H40.052: ICD-10-CM

## 2022-03-21 DIAGNOSIS — H35.3231: ICD-10-CM

## 2022-03-21 PROCEDURE — 92202 OPSCPY EXTND ON/MAC DRAW: CPT

## 2022-03-21 PROCEDURE — 92134 CPTRZ OPH DX IMG PST SGM RTA: CPT

## 2022-03-21 PROCEDURE — 99214 OFFICE O/P EST MOD 30 MIN: CPT

## 2022-03-21 ASSESSMENT — VISUAL ACUITY
OS_CC: 20/40+1
OD_CC: 20/30-2

## 2022-03-21 ASSESSMENT — TONOMETRY
OS_IOP_MMHG: 19
OD_IOP_MMHG: 15

## 2022-05-16 ENCOUNTER — FOLLOW UP (OUTPATIENT)
Dept: URBAN - METROPOLITAN AREA CLINIC 98 | Facility: CLINIC | Age: 77
End: 2022-05-16

## 2022-05-16 DIAGNOSIS — H40.052: ICD-10-CM

## 2022-05-16 DIAGNOSIS — H35.3231: ICD-10-CM

## 2022-05-16 PROCEDURE — 92202 OPSCPY EXTND ON/MAC DRAW: CPT

## 2022-05-16 PROCEDURE — 92134 CPTRZ OPH DX IMG PST SGM RTA: CPT

## 2022-05-16 PROCEDURE — 92014 COMPRE OPH EXAM EST PT 1/>: CPT

## 2022-05-16 ASSESSMENT — VISUAL ACUITY
OD_CC: 20/50+2
OS_CC: 20/50+2

## 2022-05-16 ASSESSMENT — TONOMETRY
OS_IOP_MMHG: 18
OD_IOP_MMHG: 16

## 2022-08-22 ENCOUNTER — FOLLOW UP (OUTPATIENT)
Dept: URBAN - METROPOLITAN AREA CLINIC 98 | Facility: CLINIC | Age: 77
End: 2022-08-22

## 2022-08-22 DIAGNOSIS — H40.052: ICD-10-CM

## 2022-08-22 DIAGNOSIS — H35.3231: ICD-10-CM

## 2022-08-22 PROCEDURE — 92202 OPSCPY EXTND ON/MAC DRAW: CPT

## 2022-08-22 PROCEDURE — 92014 COMPRE OPH EXAM EST PT 1/>: CPT

## 2022-08-22 PROCEDURE — 92134 CPTRZ OPH DX IMG PST SGM RTA: CPT

## 2022-08-22 ASSESSMENT — VISUAL ACUITY
OS_CC: 20/50-1
OD_CC: 20/50-2

## 2022-08-22 ASSESSMENT — TONOMETRY
OD_IOP_MMHG: 19
OS_IOP_MMHG: 19

## 2022-11-21 ENCOUNTER — FOLLOW UP (OUTPATIENT)
Dept: URBAN - METROPOLITAN AREA CLINIC 98 | Facility: CLINIC | Age: 77
End: 2022-11-21

## 2022-11-21 DIAGNOSIS — H35.372: ICD-10-CM

## 2022-11-21 DIAGNOSIS — H35.3231: ICD-10-CM

## 2022-11-21 DIAGNOSIS — H40.052: ICD-10-CM

## 2022-11-21 PROCEDURE — 92202 OPSCPY EXTND ON/MAC DRAW: CPT

## 2022-11-21 PROCEDURE — 92134 CPTRZ OPH DX IMG PST SGM RTA: CPT

## 2022-11-21 PROCEDURE — 92014 COMPRE OPH EXAM EST PT 1/>: CPT

## 2022-11-21 ASSESSMENT — VISUAL ACUITY
OS_SC: 20/40-2
OD_SC: 20/50+1

## 2022-11-21 ASSESSMENT — TONOMETRY
OD_IOP_MMHG: 18
OS_IOP_MMHG: 20

## 2023-02-27 ENCOUNTER — FOLLOW UP (OUTPATIENT)
Dept: URBAN - METROPOLITAN AREA CLINIC 98 | Facility: CLINIC | Age: 78
End: 2023-02-27

## 2023-02-27 DIAGNOSIS — H35.372: ICD-10-CM

## 2023-02-27 DIAGNOSIS — H35.3231: ICD-10-CM

## 2023-02-27 DIAGNOSIS — H40.052: ICD-10-CM

## 2023-02-27 PROCEDURE — 92014 COMPRE OPH EXAM EST PT 1/>: CPT | Mod: 25

## 2023-02-27 PROCEDURE — 67028 INJECTION EYE DRUG: CPT

## 2023-02-27 PROCEDURE — 92202 OPSCPY EXTND ON/MAC DRAW: CPT | Mod: 59

## 2023-02-27 PROCEDURE — 92134 CPTRZ OPH DX IMG PST SGM RTA: CPT

## 2023-02-27 PROCEDURE — PFS EYLEA PFS

## 2023-02-27 ASSESSMENT — VISUAL ACUITY
OD_CC: 20/50-1
OD_PH: 20/40-1
OS_CC: 20/350

## 2023-02-27 ASSESSMENT — TONOMETRY
OD_IOP_MMHG: 19
OS_IOP_MMHG: 21

## 2023-04-10 ENCOUNTER — FOLLOW UP (OUTPATIENT)
Dept: URBAN - METROPOLITAN AREA CLINIC 98 | Facility: CLINIC | Age: 78
End: 2023-04-10

## 2023-04-10 DIAGNOSIS — H35.372: ICD-10-CM

## 2023-04-10 DIAGNOSIS — H40.052: ICD-10-CM

## 2023-04-10 DIAGNOSIS — H35.3231: ICD-10-CM

## 2023-04-10 PROCEDURE — 92202 OPSCPY EXTND ON/MAC DRAW: CPT | Mod: 59

## 2023-04-10 PROCEDURE — 67028 INJECTION EYE DRUG: CPT

## 2023-04-10 PROCEDURE — 92134 CPTRZ OPH DX IMG PST SGM RTA: CPT

## 2023-04-10 PROCEDURE — PFS EYLEA PFS

## 2023-04-10 PROCEDURE — 92014 COMPRE OPH EXAM EST PT 1/>: CPT | Mod: 25

## 2023-04-10 ASSESSMENT — TONOMETRY
OD_IOP_MMHG: 19
OS_IOP_MMHG: 21

## 2023-04-10 ASSESSMENT — VISUAL ACUITY
OD_CC: 20/50-2
OS_CC: 20/150-1

## 2023-05-22 ENCOUNTER — FOLLOW UP (OUTPATIENT)
Dept: URBAN - METROPOLITAN AREA CLINIC 98 | Facility: CLINIC | Age: 78
End: 2023-05-22

## 2023-05-22 DIAGNOSIS — H40.052: ICD-10-CM

## 2023-05-22 DIAGNOSIS — H35.3231: ICD-10-CM

## 2023-05-22 DIAGNOSIS — H35.372: ICD-10-CM

## 2023-05-22 PROCEDURE — 92202 OPSCPY EXTND ON/MAC DRAW: CPT | Mod: 59

## 2023-05-22 PROCEDURE — PFS EYLEA PFS

## 2023-05-22 PROCEDURE — 92134 CPTRZ OPH DX IMG PST SGM RTA: CPT

## 2023-05-22 PROCEDURE — 92014 COMPRE OPH EXAM EST PT 1/>: CPT | Mod: 25

## 2023-05-22 PROCEDURE — 67028 INJECTION EYE DRUG: CPT

## 2023-05-22 ASSESSMENT — TONOMETRY
OS_IOP_MMHG: 20
OD_IOP_MMHG: 20

## 2023-05-22 ASSESSMENT — VISUAL ACUITY
OD_CC: 20/50-2
OS_CC: 20/250

## 2023-07-10 ENCOUNTER — FOLLOW UP (OUTPATIENT)
Dept: URBAN - METROPOLITAN AREA CLINIC 98 | Facility: CLINIC | Age: 78
End: 2023-07-10

## 2023-07-10 DIAGNOSIS — H40.052: ICD-10-CM

## 2023-07-10 DIAGNOSIS — H35.3231: ICD-10-CM

## 2023-07-10 DIAGNOSIS — H35.372: ICD-10-CM

## 2023-07-10 PROCEDURE — 92014 COMPRE OPH EXAM EST PT 1/>: CPT | Mod: 25

## 2023-07-10 PROCEDURE — 92202 OPSCPY EXTND ON/MAC DRAW: CPT | Mod: NC

## 2023-07-10 PROCEDURE — PFS EYLEA PFS: Mod: JZ

## 2023-07-10 PROCEDURE — 92134 CPTRZ OPH DX IMG PST SGM RTA: CPT

## 2023-07-10 PROCEDURE — 67028 INJECTION EYE DRUG: CPT

## 2023-07-10 ASSESSMENT — VISUAL ACUITY
OS_SC: 20/250
OD_SC: 20/25-1

## 2023-07-10 ASSESSMENT — TONOMETRY
OS_IOP_MMHG: 16
OD_IOP_MMHG: 18

## 2023-08-21 ENCOUNTER — FOLLOW UP (OUTPATIENT)
Dept: URBAN - METROPOLITAN AREA CLINIC 98 | Facility: CLINIC | Age: 78
End: 2023-08-21

## 2023-08-21 DIAGNOSIS — H35.3231: ICD-10-CM

## 2023-08-21 DIAGNOSIS — H40.052: ICD-10-CM

## 2023-08-21 DIAGNOSIS — Z98.42: ICD-10-CM

## 2023-08-21 DIAGNOSIS — H35.372: ICD-10-CM

## 2023-08-21 DIAGNOSIS — Z98.41: ICD-10-CM

## 2023-08-21 PROCEDURE — PFS EYLEA PFS: Mod: JZ

## 2023-08-21 PROCEDURE — 92202 OPSCPY EXTND ON/MAC DRAW: CPT | Mod: 59

## 2023-08-21 PROCEDURE — 67028 INJECTION EYE DRUG: CPT

## 2023-08-21 PROCEDURE — 92014 COMPRE OPH EXAM EST PT 1/>: CPT | Mod: 25

## 2023-08-21 PROCEDURE — 92134 CPTRZ OPH DX IMG PST SGM RTA: CPT

## 2023-08-21 ASSESSMENT — VISUAL ACUITY
OS_SC: CF 5FT
OD_SC: 20/30-2

## 2023-08-21 ASSESSMENT — TONOMETRY
OS_IOP_MMHG: 14
OD_IOP_MMHG: 12

## 2023-10-02 ENCOUNTER — FOLLOW UP (OUTPATIENT)
Dept: URBAN - METROPOLITAN AREA CLINIC 98 | Facility: CLINIC | Age: 78
End: 2023-10-02

## 2023-10-02 DIAGNOSIS — H35.3231: ICD-10-CM

## 2023-10-02 DIAGNOSIS — H40.052: ICD-10-CM

## 2023-10-02 DIAGNOSIS — H47.20: ICD-10-CM

## 2023-10-02 DIAGNOSIS — Z98.42: ICD-10-CM

## 2023-10-02 DIAGNOSIS — Z98.41: ICD-10-CM

## 2023-10-02 DIAGNOSIS — H43.812: ICD-10-CM

## 2023-10-02 DIAGNOSIS — H35.372: ICD-10-CM

## 2023-10-02 PROCEDURE — PFS EYLEA PFS: Mod: JZ

## 2023-10-02 PROCEDURE — 92201 OPSCPY EXTND RTA DRAW UNI/BI: CPT | Mod: 59

## 2023-10-02 PROCEDURE — 92014 COMPRE OPH EXAM EST PT 1/>: CPT | Mod: 25

## 2023-10-02 PROCEDURE — 92134 CPTRZ OPH DX IMG PST SGM RTA: CPT

## 2023-10-02 PROCEDURE — 67028 INJECTION EYE DRUG: CPT

## 2023-10-02 ASSESSMENT — VISUAL ACUITY
OS_CC: CF 4FT
OD_CC: 20/25+2

## 2023-10-02 ASSESSMENT — TONOMETRY
OS_IOP_MMHG: 16
OD_IOP_MMHG: 14

## 2023-11-13 ENCOUNTER — FOLLOW UP (OUTPATIENT)
Dept: URBAN - METROPOLITAN AREA CLINIC 98 | Facility: CLINIC | Age: 78
End: 2023-11-13

## 2023-11-13 DIAGNOSIS — H43.812: ICD-10-CM

## 2023-11-13 DIAGNOSIS — H35.372: ICD-10-CM

## 2023-11-13 DIAGNOSIS — H40.052: ICD-10-CM

## 2023-11-13 DIAGNOSIS — H35.3231: ICD-10-CM

## 2023-11-13 DIAGNOSIS — H47.20: ICD-10-CM

## 2023-11-13 PROCEDURE — 92134 CPTRZ OPH DX IMG PST SGM RTA: CPT

## 2023-11-13 PROCEDURE — 67028 INJECTION EYE DRUG: CPT

## 2023-11-13 PROCEDURE — PFS EYLEA PFS: Mod: JZ

## 2023-11-13 PROCEDURE — 92014 COMPRE OPH EXAM EST PT 1/>: CPT | Mod: 25

## 2023-11-13 PROCEDURE — 92202 OPSCPY EXTND ON/MAC DRAW: CPT | Mod: 59

## 2023-11-13 ASSESSMENT — VISUAL ACUITY
OS_CC: 20/400
OD_CC: 20/20-2

## 2023-11-13 ASSESSMENT — TONOMETRY
OD_IOP_MMHG: 14
OS_IOP_MMHG: 14

## 2023-12-28 ENCOUNTER — FOLLOW UP (OUTPATIENT)
Dept: URBAN - METROPOLITAN AREA CLINIC 98 | Facility: CLINIC | Age: 78
End: 2023-12-28

## 2023-12-28 DIAGNOSIS — H35.372: ICD-10-CM

## 2023-12-28 DIAGNOSIS — H47.20: ICD-10-CM

## 2023-12-28 DIAGNOSIS — H40.052: ICD-10-CM

## 2023-12-28 DIAGNOSIS — H43.812: ICD-10-CM

## 2023-12-28 DIAGNOSIS — H35.3231: ICD-10-CM

## 2023-12-28 PROCEDURE — 92014 COMPRE OPH EXAM EST PT 1/>: CPT

## 2023-12-28 PROCEDURE — 92134 CPTRZ OPH DX IMG PST SGM RTA: CPT

## 2023-12-28 PROCEDURE — 92202 OPSCPY EXTND ON/MAC DRAW: CPT

## 2023-12-28 ASSESSMENT — VISUAL ACUITY
OS_CC: 20/400
OD_CC: 20/20-2

## 2023-12-28 ASSESSMENT — TONOMETRY
OS_IOP_MMHG: 14
OD_IOP_MMHG: 14

## 2024-02-12 ENCOUNTER — FOLLOW UP (OUTPATIENT)
Dept: URBAN - METROPOLITAN AREA CLINIC 98 | Facility: CLINIC | Age: 79
End: 2024-02-12

## 2024-02-12 DIAGNOSIS — H47.20: ICD-10-CM

## 2024-02-12 DIAGNOSIS — H43.812: ICD-10-CM

## 2024-02-12 DIAGNOSIS — H35.372: ICD-10-CM

## 2024-02-12 DIAGNOSIS — H40.052: ICD-10-CM

## 2024-02-12 DIAGNOSIS — H35.3231: ICD-10-CM

## 2024-02-12 PROCEDURE — 92202 OPSCPY EXTND ON/MAC DRAW: CPT | Mod: 59

## 2024-02-12 PROCEDURE — 92134 CPTRZ OPH DX IMG PST SGM RTA: CPT

## 2024-02-12 PROCEDURE — PFS EYLEA PFS: Mod: JZ

## 2024-02-12 PROCEDURE — 92014 COMPRE OPH EXAM EST PT 1/>: CPT | Mod: 25

## 2024-02-12 PROCEDURE — 67028 INJECTION EYE DRUG: CPT

## 2024-02-12 ASSESSMENT — VISUAL ACUITY
OD_CC: 20/30-2
OS_CC: CF 2FT

## 2024-02-12 ASSESSMENT — TONOMETRY
OD_IOP_MMHG: 15
OS_IOP_MMHG: 18

## 2024-05-20 ENCOUNTER — FOLLOW UP (OUTPATIENT)
Dept: URBAN - METROPOLITAN AREA CLINIC 98 | Facility: CLINIC | Age: 79
End: 2024-05-20

## 2024-05-20 DIAGNOSIS — H35.3231: ICD-10-CM

## 2024-05-20 DIAGNOSIS — H40.052: ICD-10-CM

## 2024-05-20 DIAGNOSIS — H43.812: ICD-10-CM

## 2024-05-20 DIAGNOSIS — H47.20: ICD-10-CM

## 2024-05-20 DIAGNOSIS — H35.372: ICD-10-CM

## 2024-05-20 PROCEDURE — PFS EYLEA PFS: Mod: JZ

## 2024-05-20 PROCEDURE — 92014 COMPRE OPH EXAM EST PT 1/>: CPT | Mod: 25

## 2024-05-20 PROCEDURE — 92134 CPTRZ OPH DX IMG PST SGM RTA: CPT

## 2024-05-20 PROCEDURE — 92202 OPSCPY EXTND ON/MAC DRAW: CPT | Mod: 59

## 2024-05-20 PROCEDURE — 67028 INJECTION EYE DRUG: CPT

## 2024-05-20 ASSESSMENT — VISUAL ACUITY
OS_CC: CF 3FT
OD_CC: 20/30+2

## 2024-05-20 ASSESSMENT — TONOMETRY
OD_IOP_MMHG: 14
OS_IOP_MMHG: 16

## 2024-08-19 ENCOUNTER — FOLLOW UP (OUTPATIENT)
Dept: URBAN - METROPOLITAN AREA CLINIC 98 | Facility: CLINIC | Age: 79
End: 2024-08-19

## 2024-08-19 DIAGNOSIS — H47.20: ICD-10-CM

## 2024-08-19 DIAGNOSIS — H40.052: ICD-10-CM

## 2024-08-19 DIAGNOSIS — H43.812: ICD-10-CM

## 2024-08-19 DIAGNOSIS — H35.372: ICD-10-CM

## 2024-08-19 DIAGNOSIS — H35.3231: ICD-10-CM

## 2024-08-19 PROCEDURE — PFS EYLEA PFS: Mod: JZ

## 2024-08-19 PROCEDURE — 92134 CPTRZ OPH DX IMG PST SGM RTA: CPT

## 2024-08-19 PROCEDURE — 92014 COMPRE OPH EXAM EST PT 1/>: CPT | Mod: 25

## 2024-08-19 PROCEDURE — 67028 INJECTION EYE DRUG: CPT

## 2024-08-19 PROCEDURE — 92202 OPSCPY EXTND ON/MAC DRAW: CPT | Mod: 59

## 2024-08-19 ASSESSMENT — VISUAL ACUITY
OS_CC: CF 2FT
OD_CC: 20/25-1

## 2024-08-19 ASSESSMENT — TONOMETRY
OD_IOP_MMHG: 20
OS_IOP_MMHG: 20

## 2024-11-18 ENCOUNTER — FOLLOW UP (OUTPATIENT)
Dept: URBAN - METROPOLITAN AREA CLINIC 98 | Facility: CLINIC | Age: 79
End: 2024-11-18

## 2024-11-18 DIAGNOSIS — H35.3231: ICD-10-CM

## 2024-11-18 DIAGNOSIS — H35.372: ICD-10-CM

## 2024-11-18 DIAGNOSIS — H40.052: ICD-10-CM

## 2024-11-18 DIAGNOSIS — H43.812: ICD-10-CM

## 2024-11-18 PROCEDURE — PFS EYLEA PFS: Mod: JZ

## 2024-11-18 PROCEDURE — 67028 INJECTION EYE DRUG: CPT

## 2024-11-18 PROCEDURE — 92014 COMPRE OPH EXAM EST PT 1/>: CPT | Mod: 25

## 2024-11-18 PROCEDURE — 92250 FUNDUS PHOTOGRAPHY W/I&R: CPT | Mod: NC

## 2024-11-18 PROCEDURE — 92202 OPSCPY EXTND ON/MAC DRAW: CPT | Mod: 59

## 2024-11-18 ASSESSMENT — TONOMETRY
OS_IOP_MMHG: 20
OD_IOP_MMHG: 19

## 2024-11-18 ASSESSMENT — VISUAL ACUITY
OD_CC: 20/50-2
OS_CC: CF 3FT

## 2024-12-16 ENCOUNTER — FOLLOW UP (OUTPATIENT)
Dept: URBAN - METROPOLITAN AREA CLINIC 98 | Facility: CLINIC | Age: 79
End: 2024-12-16

## 2024-12-16 DIAGNOSIS — H35.3231: ICD-10-CM

## 2024-12-16 PROCEDURE — 92202 OPSCPY EXTND ON/MAC DRAW: CPT | Mod: NC

## 2024-12-16 PROCEDURE — 92014 COMPRE OPH EXAM EST PT 1/>: CPT | Mod: 25

## 2024-12-16 PROCEDURE — 67028 INJECTION EYE DRUG: CPT

## 2024-12-16 PROCEDURE — 92134 CPTRZ OPH DX IMG PST SGM RTA: CPT

## 2024-12-16 ASSESSMENT — TONOMETRY
OS_IOP_MMHG: 15
OD_IOP_MMHG: 13

## 2024-12-16 ASSESSMENT — VISUAL ACUITY
OD_CC: 20/50-2
OS_CC: CF 2FT

## 2025-01-27 ENCOUNTER — FOLLOW UP (OUTPATIENT)
Dept: URBAN - METROPOLITAN AREA CLINIC 98 | Facility: CLINIC | Age: 80
End: 2025-01-27

## 2025-01-27 DIAGNOSIS — H35.3231: ICD-10-CM

## 2025-01-27 DIAGNOSIS — Z98.41: ICD-10-CM

## 2025-01-27 PROCEDURE — 92014 COMPRE OPH EXAM EST PT 1/>: CPT | Mod: 25

## 2025-01-27 PROCEDURE — 67028 INJECTION EYE DRUG: CPT

## 2025-01-27 PROCEDURE — 92134 CPTRZ OPH DX IMG PST SGM RTA: CPT

## 2025-01-27 PROCEDURE — 92202 OPSCPY EXTND ON/MAC DRAW: CPT | Mod: NC

## 2025-01-27 ASSESSMENT — VISUAL ACUITY
OD_CC: 20/40-2
OS_CC: CF 2FT

## 2025-01-27 ASSESSMENT — TONOMETRY
OD_IOP_MMHG: 13
OS_IOP_MMHG: 15

## 2025-04-10 ENCOUNTER — FOLLOW UP (OUTPATIENT)
Dept: URBAN - METROPOLITAN AREA CLINIC 98 | Facility: CLINIC | Age: 80
End: 2025-04-10

## 2025-04-10 DIAGNOSIS — H35.372: ICD-10-CM

## 2025-04-10 DIAGNOSIS — H43.812: ICD-10-CM

## 2025-04-10 DIAGNOSIS — H35.3231: ICD-10-CM

## 2025-04-10 PROCEDURE — 92134 CPTRZ OPH DX IMG PST SGM RTA: CPT

## 2025-04-10 PROCEDURE — 92014 COMPRE OPH EXAM EST PT 1/>: CPT | Mod: 25

## 2025-04-10 PROCEDURE — 92202 OPSCPY EXTND ON/MAC DRAW: CPT | Mod: 59

## 2025-04-10 PROCEDURE — 67028 INJECTION EYE DRUG: CPT

## 2025-04-10 ASSESSMENT — TONOMETRY
OS_IOP_MMHG: 16
OD_IOP_MMHG: 15

## 2025-04-10 ASSESSMENT — VISUAL ACUITY
OD_SC: 20/50+1
OS_SC: CF 3FT

## 2025-05-22 ENCOUNTER — FOLLOW UP (OUTPATIENT)
Dept: URBAN - METROPOLITAN AREA CLINIC 98 | Facility: CLINIC | Age: 80
End: 2025-05-22

## 2025-05-22 DIAGNOSIS — H35.372: ICD-10-CM

## 2025-05-22 DIAGNOSIS — H43.812: ICD-10-CM

## 2025-05-22 DIAGNOSIS — H35.3231: ICD-10-CM

## 2025-05-22 PROCEDURE — 92014 COMPRE OPH EXAM EST PT 1/>: CPT | Mod: 25

## 2025-05-22 PROCEDURE — 92134 CPTRZ OPH DX IMG PST SGM RTA: CPT

## 2025-05-22 PROCEDURE — 92202 OPSCPY EXTND ON/MAC DRAW: CPT | Mod: 59

## 2025-05-22 PROCEDURE — 67028 INJECTION EYE DRUG: CPT

## 2025-05-22 ASSESSMENT — TONOMETRY
OD_IOP_MMHG: 16
OS_IOP_MMHG: 17

## 2025-05-22 ASSESSMENT — VISUAL ACUITY
OS_SC: CF 4FT
OD_SC: 20/40-1